# Patient Record
Sex: MALE | Race: WHITE | NOT HISPANIC OR LATINO | URBAN - METROPOLITAN AREA
[De-identification: names, ages, dates, MRNs, and addresses within clinical notes are randomized per-mention and may not be internally consistent; named-entity substitution may affect disease eponyms.]

---

## 2020-05-16 ENCOUNTER — HOSPITAL ENCOUNTER (EMERGENCY)
Facility: HOSPITAL | Age: 45
Discharge: HOME | End: 2020-05-16
Attending: SURGERY
Payer: COMMERCIAL

## 2020-05-16 VITALS
HEART RATE: 111 BPM | RESPIRATION RATE: 18 BRPM | TEMPERATURE: 97 F | SYSTOLIC BLOOD PRESSURE: 176 MMHG | DIASTOLIC BLOOD PRESSURE: 98 MMHG | HEIGHT: 72 IN | BODY MASS INDEX: 39.28 KG/M2 | WEIGHT: 290 LBS | OXYGEN SATURATION: 98 %

## 2020-05-16 DIAGNOSIS — S91.011A LACERATION OF RIGHT ANKLE, INITIAL ENCOUNTER: Primary | ICD-10-CM

## 2020-05-16 DIAGNOSIS — M25.571 RIGHT ANKLE PAIN: ICD-10-CM

## 2020-05-16 PROCEDURE — 25000003 PHARM REV CODE 250: Performed by: SURGERY

## 2020-05-16 PROCEDURE — 99284 EMERGENCY DEPT VISIT MOD MDM: CPT | Mod: 25

## 2020-05-16 PROCEDURE — 12002 RPR S/N/AX/GEN/TRNK2.6-7.5CM: CPT

## 2020-05-16 RX ORDER — MUPIROCIN 20 MG/G
OINTMENT TOPICAL 3 TIMES DAILY
Qty: 15 G | Refills: 0 | Status: SHIPPED | OUTPATIENT
Start: 2020-05-16 | End: 2020-05-26

## 2020-05-16 RX ORDER — LIDOCAINE HYDROCHLORIDE 10 MG/ML
10 INJECTION, SOLUTION EPIDURAL; INFILTRATION; INTRACAUDAL; PERINEURAL
Status: COMPLETED | OUTPATIENT
Start: 2020-05-16 | End: 2020-05-16

## 2020-05-16 RX ORDER — MUPIROCIN 20 MG/G
1 OINTMENT TOPICAL
Status: COMPLETED | OUTPATIENT
Start: 2020-05-16 | End: 2020-05-16

## 2020-05-16 RX ORDER — SULFAMETHOXAZOLE AND TRIMETHOPRIM 800; 160 MG/1; MG/1
1 TABLET ORAL 2 TIMES DAILY
Qty: 14 TABLET | Refills: 0 | Status: SHIPPED | OUTPATIENT
Start: 2020-05-16 | End: 2020-05-23

## 2020-05-16 RX ORDER — HYDROCODONE BITARTRATE AND ACETAMINOPHEN 5; 325 MG/1; MG/1
1 TABLET ORAL EVERY 4 HOURS PRN
Qty: 6 TABLET | Refills: 0 | Status: SHIPPED | OUTPATIENT
Start: 2020-05-16 | End: 2020-05-18

## 2020-05-16 RX ADMIN — LIDOCAINE HYDROCHLORIDE 100 MG: 10 INJECTION, SOLUTION EPIDURAL; INFILTRATION; INTRACAUDAL; PERINEURAL at 05:05

## 2020-05-16 RX ADMIN — MUPIROCIN 22 G: 20 OINTMENT TOPICAL at 05:05

## 2020-05-16 NOTE — ED PROVIDER NOTES
[facility]                                                 Chief Complaint  44 y.o. male with Laceration (to right ankle )    History of Present Illness  Nathaniel Macias presents to the emergency room with right ankle laceration  Pt slipped getting into the shower and cut his right ankle area anteriorly  Patient has no joint involvement, no tendon involvement, good ROM now  Patient has no active bleeding at this time, superficial injury on ER exam    The history is provided by the patient   device was not used during this ER visit  History reviewed. No pertinent past medical history.  History reviewed. No pertinent surgical history.   Allergies: Penicillin    I have reviewed all of this patient's past medical, surgical, family, and social   histories as well as active allergies and medications documented in the  electronic medical record    Review of Systems and Physical Exam      Review of Systems  -- Constitution - no fever, denies fatigue, no weakness, no chills  -- Eyes - no tearing or redness, no visual disturbance  -- Ear, Nose - no tinnitus or earache, no nasal congestion or discharge  -- Mouth,Throat - no sore throat, no toothache, normal voice, normal swallowing  -- Respiratory - denies cough and congestion, no shortness of breath, no FELIPE  -- Cardiovascular - denies chest pain, no palpitations, denies claudication  -- Gastrointestinal - denies abdominal pain, nausea, vomiting, or diarrhea  -- Genitourinary - no dysuria, denies flank pain, no hematuria, no STD risk  -- Musculoskeletal - denies back pain, negative for trauma or injury  -- Neurological - no headache, denies weakness or seizure; no LOC  -- Skin - 4 centimeter right anterior ankle laceration    Vital Signs  His oral temperature is 96.8 °F (36 °C).   His blood pressure is 176/98 and his pulse is 111  His respiration is 18 and oxygen saturation is 98%.     Physical Exam  -- Nursing note and vitals reviewed  --  Constitutional: Appears well-developed and well-nourished  -- Head: Atraumatic. Normocephalic. No obvious abnormality  -- Cardiac: Normal rate, regular rhythm and normal heart sounds  -- Pulmonary: Normal respiratory effort, breath sounds clear to auscultation  -- Abdominal: Soft, no tenderness. Normal bowel sounds. Normal liver edge  -- Musculoskeletal: Normal range of motion, no effusions. Joints stable   -- Neurological: No focal deficits. Showed good interaction with staff  -- Vascular: Posterior tibial, dorsalis pedis and radial pulses 2+ bilaterally    -- Lymphatics: No cervical or peripheral lymphadenopathy. No edema noted  -- Skin: 4 centimeter right anterior ankle laceration, no joint involvement    Emergency Room Course      Laceration Repair  -- Performed by: LUPIS HONEYCUTT  -- Consent Done: Emergent Situation  -- Body area: Right ankle  -- Laceration length: 4 centimeters  -- Tendon involvement: none  -- Nerve involvement: none  -- Vascular damage: no  -- Anesthesia: local infiltration  -- Local anesthetic: lidocaine 1%   -- Anesthetic total: 10 ml  -- Irrigation solution: saline  -- Amount of cleaning: standard  -- Skin closure: 4-0 nylon  -- Number of sutures: 7  -- Approximation difficulty: simple  -- Dressing: antibiotic ointment    Radiology  -- ER ankle x-ray readings showed no evidence of fracture or dislocation  -- All x-rays are reviewed with a final disposition given by the radiologist     Medications Given  Tdap vaccine injection 0.5 mL (0.5 mLs Intramuscular Not Given 5/16/20 1715)   lidocaine (PF) 10 mg/ml (1%) injection 100 mg (100 mg Infiltration Given 5/16/20 1706)   mupirocin 2 % ointment 22 g (22 g Topical (Top) Given 5/16/20 1707)     ED Physician Management  -- Diagnosis management comments: 44 y.o. male with right ankle laceration  -- no joint involvement, no active bleeding, sutures performed in the ER today  -- patient place nonweightbearing on the right ankle with crutches on  discharge  -- patient come back in 7-10 days for suture removal, voiced understanding  -- patient counseled on wound cleaning, pain-free on ER discharge today  -- x-ray showed no fracture or acute finding today    Diagnosis  -- The primary encounter diagnosis was Laceration of right ankle, initial encounter.   -- A diagnosis of Right ankle pain was also pertinent to this visit.    Disposition and Plan  -- Disposition: home  -- Condition: stable  -- Follow-up: Patient to follow up with MD in 1-2 days.  -- I advised the patient that we have found no life threatening condition today  -- At this time, I believe the patient is clinically stable for discharge.   -- The patient acknowledges that close follow up with a MD is required   -- Patient agrees to comply with all instruction and direction given in the ER    This note is dictated on M*Modal word recognition program.  There are word recognition mistakes that are occasionally missed on review.         Magnus Cheek MD  05/16/20 [id number]

## 2020-05-16 NOTE — ED TRIAGE NOTES
Laceration noted to dorsal aspect of right foot/ankle. Lac is 4cm in length with bleeding under control. Patient reports he slipped in the shower and hit his foot on a piece of metal.